# Patient Record
Sex: MALE | Race: WHITE | Employment: FULL TIME | URBAN - METROPOLITAN AREA
[De-identification: names, ages, dates, MRNs, and addresses within clinical notes are randomized per-mention and may not be internally consistent; named-entity substitution may affect disease eponyms.]

---

## 2021-04-08 ENCOUNTER — RECORDS - HEALTHEAST (OUTPATIENT)
Dept: LAB | Facility: CLINIC | Age: 26
End: 2021-04-08

## 2021-04-08 LAB
CHOLEST SERPL-MCNC: 227 MG/DL
FASTING STATUS PATIENT QL REPORTED: ABNORMAL
FASTING STATUS PATIENT QL REPORTED: NORMAL
GLUCOSE BLD-MCNC: 91 MG/DL (ref 70–125)
HDLC SERPL-MCNC: 49 MG/DL
LDLC SERPL CALC-MCNC: 124 MG/DL
TRIGL SERPL-MCNC: 270 MG/DL

## 2022-07-19 ENCOUNTER — LAB REQUISITION (OUTPATIENT)
Dept: LAB | Facility: CLINIC | Age: 27
End: 2022-07-19

## 2022-07-19 DIAGNOSIS — Z13.6 ENCOUNTER FOR SCREENING FOR CARDIOVASCULAR DISORDERS: ICD-10-CM

## 2022-07-19 PROCEDURE — 80061 LIPID PANEL: CPT | Performed by: FAMILY MEDICINE

## 2022-07-20 LAB
CHOLEST SERPL-MCNC: 222 MG/DL
HDLC SERPL-MCNC: 51 MG/DL
LDLC SERPL CALC-MCNC: 150 MG/DL
NONHDLC SERPL-MCNC: 171 MG/DL
TRIGL SERPL-MCNC: 107 MG/DL

## 2022-12-02 ENCOUNTER — LAB REQUISITION (OUTPATIENT)
Dept: LAB | Facility: CLINIC | Age: 27
End: 2022-12-02

## 2022-12-02 DIAGNOSIS — R07.9 CHEST PAIN, UNSPECIFIED: ICD-10-CM

## 2022-12-02 LAB
CRP SERPL-MCNC: <3 MG/L
ERYTHROCYTE [SEDIMENTATION RATE] IN BLOOD BY WESTERGREN METHOD: 3 MM/HR (ref 0–15)
LIPASE SERPL-CCNC: 48 U/L (ref 13–60)

## 2022-12-02 PROCEDURE — 83690 ASSAY OF LIPASE: CPT | Performed by: STUDENT IN AN ORGANIZED HEALTH CARE EDUCATION/TRAINING PROGRAM

## 2022-12-02 PROCEDURE — 86140 C-REACTIVE PROTEIN: CPT | Performed by: STUDENT IN AN ORGANIZED HEALTH CARE EDUCATION/TRAINING PROGRAM

## 2022-12-02 PROCEDURE — 85652 RBC SED RATE AUTOMATED: CPT | Performed by: STUDENT IN AN ORGANIZED HEALTH CARE EDUCATION/TRAINING PROGRAM

## 2022-12-07 ENCOUNTER — OFFICE VISIT (OUTPATIENT)
Dept: CARDIOLOGY | Facility: CLINIC | Age: 27
End: 2022-12-07
Payer: COMMERCIAL

## 2022-12-07 VITALS
WEIGHT: 182.9 LBS | HEART RATE: 83 BPM | BODY MASS INDEX: 27.72 KG/M2 | SYSTOLIC BLOOD PRESSURE: 147 MMHG | DIASTOLIC BLOOD PRESSURE: 90 MMHG | HEIGHT: 68 IN

## 2022-12-07 DIAGNOSIS — R07.9 CHEST PAIN, UNSPECIFIED TYPE: Primary | ICD-10-CM

## 2022-12-07 DIAGNOSIS — R94.31 ABNORMAL ELECTROCARDIOGRAM: ICD-10-CM

## 2022-12-07 DIAGNOSIS — E78.5 HYPERLIPIDEMIA LDL GOAL <100: ICD-10-CM

## 2022-12-07 PROCEDURE — 99204 OFFICE O/P NEW MOD 45 MIN: CPT | Performed by: INTERNAL MEDICINE

## 2022-12-07 PROCEDURE — 93000 ELECTROCARDIOGRAM COMPLETE: CPT | Performed by: INTERNAL MEDICINE

## 2022-12-07 NOTE — LETTER
12/7/2022    Norbert Beasley MD  [address]  [facility] [address]    RE: Miguel Angel Horne       Dear Colleague,     I had the pleasure of seeing Miguel Angel Horne in the [facility].  HPI and Plan:   See dictation    Today's clinic visit entailed:  Review of external notes as documented elsewhere in note    Provider  Link to MDM Help Grid     The level of medical decision making during this visit was of moderate complexity.      Orders Placed This Encounter   Procedures     Follow-Up with Cardiology MICHELLE     EKG 12-lead complete w/read - Clinics (performed today)     Exercise Stress Echocardiogram       Orders Placed This Encounter   Medications     Multiple Vitamin (MULTIVITAMIN ADULT PO)     Sig: Take by mouth daily       There are no discontinued medications.      Encounter Diagnoses   Name Primary?     Chest pain, unspecified type Yes     Abnormal electrocardiogram      Hyperlipidemia LDL goal <100        CURRENT MEDICATIONS:  Current Outpatient Medications   Medication Sig Dispense Refill     Multiple Vitamin (MULTIVITAMIN ADULT PO) Take by mouth daily         ALLERGIES   No Known Allergies    PAST MEDICAL HISTORY:  History reviewed. No pertinent past medical history.    PAST SURGICAL HISTORY:  History reviewed. No pertinent surgical history.    FAMILY HISTORY:  Family History   Problem Relation Age of Onset     Thyroid Disease Mother      Bradycardia Mother      Atrial fibrillation Father 60        afib ablation       SOCIAL HISTORY:  Social History     Socioeconomic History     Marital status: Single     Spouse name: None     Number of children: None     Years of education: None     Highest education level: None   Tobacco Use     Smoking status: Never     Smokeless tobacco: Never   Substance and Sexual Activity     Alcohol use: Yes     Comment: 1 drink, 3 days week       Review of Systems:  Skin:          Eyes:         ENT:         Respiratory:  Positive for   hx asthma as a child  "  Cardiovascular:  Negative;palpitations;syncope or near-syncope;cyanosis;edema Positive for;lightheadedness;exercise intolerance chest pain, at rest, comes and goes  Gastroenterology:        Genitourinary:         Musculoskeletal:         Neurologic:         Psychiatric:         Heme/Lymph/Imm:         Endocrine:  Negative        Physical Exam:  Vitals: BP (!) 147/90   Pulse 83   Ht 1.715 m (5' 7.5\")   Wt 83 kg (182 lb 14.4 oz)   BMI 28.22 kg/m      Constitutional:  cooperative;in no acute distress        Skin:  warm and dry to the touch          Head:  normocephalic, no masses or lesions        Eyes:  pupils equal and round        Lymph:      ENT:  no pallor or cyanosis        Neck:  no carotid bruit        Respiratory:  clear to auscultation;normal symmetry         Cardiac: regular rhythm   S4 no presence of murmur          pulses full and equal                                        GI:  abdomen soft;no bruits        Extremities and Muscular Skeletal:  no deformities, clubbing, cyanosis, erythema observed;no edema              Neurological:  no gross motor deficits;affect appropriate        Psych:  Alert and Oriented x 3        CC  No referring provider defined for this encounter.                Service Date: 12/07/2022    REFERRING PHYSICIAN:  Norbert Beasley MD      HISTORY OF PRESENT ILLNESS:  Mr. Horne is a pleasant 27-year-old male who comes in with his wife today.  He was recently seen in the emergency department for new onset of chest discomfort.  This was 11/29/2022.  He stated it occurred while he was shoveling snow.  It was not particularly heavy or wet snow or deep.  He said it was about 3 inches of snow.  He had played hockey earlier that day also.  He noticed a dull ache in the left side of his chest radiating to his left shoulder.  He rated it at 3/10 at its worst.  He did not have any associated symptoms such as nausea, shortness of breath or diaphoresis.  His initial evaluation was " unremarkable.  He had troponin levels drawn, EKG and chest x-ray, all of which came back normal.  I do have the EKGs available for review.  He does have a short IL interval, but otherwise, this looks fairly normal.  He was advised to take ibuprofen or Advil for about 5 days, which he did 600 mg 3 times a day.  He thinks this may have partially helped but not completely taken it away.  He continues to have waxing and waning chest discomfort.  It does not seem to matter what he is doing, although he notes he has not exercised since this started.  It can even come in the middle of the night.  He does not note any exacerbating or relieving factors.  He does not have any underlying health conditions with the exception he has noticed that his blood pressure has been borderline high in the 130s, sometimes low 140s systolic, with diastolics in the 90s.  He also is noted to have hyperlipidemia based on a cholesterol profile from July.  His total cholesterol was 222, HDL 51,  and triglycerides 107.  There is no family history of premature coronary artery disease, although he had an uncle apparently who had a heart attack at an early age.  He does not smoke, vape or use recreational drugs.  He has moderate caffeine and alcohol intake.  He does not take any medications and has no other health conditions.  We did repeat an EKG today.  It looks fairly similar to his previous.  Again, normal sinus rhythm, no acute ST or T changes.    PHYSICAL EXAMINATION:    VITAL SIGNS:  Blood pressure today was elevated at 147/90, pulse is 83.  Weight is 182 with a body mass index of 28.  NECK:  Carotid upstrokes were brisk without bruit.  CARDIOVASCULAR:  Tones are regular and slow.  I did not appreciate a murmur, gallop, or rub.  He does have an S4.  RESPIRATORY:  Lung fields are clear.  EXTREMITIES:  He has strong and symmetric pulses in the upper and lower extremities.    CHEST:  I did palpate and press on his rib cage.  He does have  some tenderness at the costochondral junction anteriorly at ribs 1 and 2.    SUMMARY:  1.  Mr. Horne is a pleasant 27-year-old male with new onset chest discomfort with underlying history of borderline hypertension and hyperlipidemia.  His EKG is marginally abnormal with a short KS interval.  He has some tenderness along the costochondral junction anteriorly on the left side, but he feels that this is relatively new and has not really felt that the source of his pain was musculoskeletal.  I think it is reasonable to consider a stress echocardiogram for him to evaluate his chest discomfort.  I explained this test to him, and he is agreeable to proceed.  If this comes back negative, I would consider continuing treatment for costochondritis, which would include probably lower dose of nonsteroidal anti-inflammatory 200 mg per day for a period of another week.  I would also, in association with this, use a body pillow to sleep at night to avoid bad positions that may aggravate the costochondral junction.  Also, with his work sitting at a computer, pay attention to his posture and get up at least every hour, not sitting for prolonged periods of times with poor posture, which can also aggravate this as well.  I will have him come in followup with the stress test results once complete.  2. Borderline hypertension.  His blood pressures have been borderline in the past, which would indicate that he is likely to go on to develop high blood pressure.  We talked about following a DASH type diet and avoiding excessive salt along with regular exercise and weight maintenance.  He will need to continue to monitor his blood pressure and I recommend at least once a week and discuss his blood pressure measurements with his primary care provider as he may need to consider antihypertensive therapy.  3.  Hyperlipidemia.  I do note that this appears to be much higher than his previous measurement in 04/2020.  I would recommend following a  Mediterranean-style diet or DASH diet, either will help with his cholesterol, and monitor this again in a year.  I would consider treatment for cholesterol with an LDL above 160.    Please feel free to contact me with any questions you have in regards to his care and thank you for allowing me to participate in the care of your nice patient.    Eileen More DO    cc:  Norbert Beasley MD  [facility]  [address]  [place]  [address]    Eileen More DO        D: 2022   T: 2022   MT: arianne    Name:     ALY KWONG  MRN:      [id number]        Account:      [id number]   :      1995           Service Date: 2022       Document: [id number]      Thank you for allowing me to participate in the care of your patient.      Sincerely,     Eileen More DO     [facility]  cc:   No referring provider defined for this encounter.

## 2022-12-07 NOTE — PROGRESS NOTES
HPI and Plan:   See dictation    Today's clinic visit entailed:  Review of external notes as documented elsewhere in note    Provider  Link to MDM Help Grid     The level of medical decision making during this visit was of moderate complexity.      Orders Placed This Encounter   Procedures     Follow-Up with Cardiology MICHELLE     EKG 12-lead complete w/read - Clinics (performed today)     Exercise Stress Echocardiogram       Orders Placed This Encounter   Medications     Multiple Vitamin (MULTIVITAMIN ADULT PO)     Sig: Take by mouth daily       There are no discontinued medications.      Encounter Diagnoses   Name Primary?     Chest pain, unspecified type Yes     Abnormal electrocardiogram      Hyperlipidemia LDL goal <100        CURRENT MEDICATIONS:  Current Outpatient Medications   Medication Sig Dispense Refill     Multiple Vitamin (MULTIVITAMIN ADULT PO) Take by mouth daily         ALLERGIES   No Known Allergies    PAST MEDICAL HISTORY:  History reviewed. No pertinent past medical history.    PAST SURGICAL HISTORY:  History reviewed. No pertinent surgical history.    FAMILY HISTORY:  Family History   Problem Relation Age of Onset     Thyroid Disease Mother      Bradycardia Mother      Atrial fibrillation Father 60        afib ablation       SOCIAL HISTORY:  Social History     Socioeconomic History     Marital status: Single     Spouse name: None     Number of children: None     Years of education: None     Highest education level: None   Tobacco Use     Smoking status: Never     Smokeless tobacco: Never   Substance and Sexual Activity     Alcohol use: Yes     Comment: 1 drink, 3 days week       Review of Systems:  Skin:          Eyes:         ENT:         Respiratory:  Positive for   hx asthma as a child   Cardiovascular:  Negative;palpitations;syncope or near-syncope;cyanosis;edema Positive for;lightheadedness;exercise intolerance chest pain, at rest, comes and goes  Gastroenterology:        Genitourinary:        "  Musculoskeletal:         Neurologic:         Psychiatric:         Heme/Lymph/Imm:         Endocrine:  Negative        Physical Exam:  Vitals: BP (!) 147/90   Pulse 83   Ht 1.715 m (5' 7.5\")   Wt 83 kg (182 lb 14.4 oz)   BMI 28.22 kg/m      Constitutional:  cooperative;in no acute distress        Skin:  warm and dry to the touch          Head:  normocephalic, no masses or lesions        Eyes:  pupils equal and round        Lymph:      ENT:  no pallor or cyanosis        Neck:  no carotid bruit        Respiratory:  clear to auscultation;normal symmetry         Cardiac: regular rhythm   S4 no presence of murmur          pulses full and equal                                        GI:  abdomen soft;no bruits        Extremities and Muscular Skeletal:  no deformities, clubbing, cyanosis, erythema observed;no edema              Neurological:  no gross motor deficits;affect appropriate        Psych:  Alert and Oriented x 3        CC  No referring provider defined for this encounter.              "

## 2022-12-07 NOTE — PROGRESS NOTES
Service Date: 12/07/2022    REFERRING PHYSICIAN:  Norbert Beasley MD      HISTORY OF PRESENT ILLNESS:  Mr. Horne is a pleasant 27-year-old male who comes in with his wife today.  He was recently seen in the emergency department for new onset of chest discomfort.  This was 11/29/2022.  He stated it occurred while he was shoveling snow.  It was not particularly heavy or wet snow or deep.  He said it was about 3 inches of snow.  He had played hockey earlier that day also.  He noticed a dull ache in the left side of his chest radiating to his left shoulder.  He rated it at 3/10 at its worst.  He did not have any associated symptoms such as nausea, shortness of breath or diaphoresis.  His initial evaluation was unremarkable.  He had troponin levels drawn, EKG and chest x-ray, all of which came back normal.  I do have the EKGs available for review.  He does have a short RI interval, but otherwise, this looks fairly normal.  He was advised to take ibuprofen or Advil for about 5 days, which he did 600 mg 3 times a day.  He thinks this may have partially helped but not completely taken it away.  He continues to have waxing and waning chest discomfort.  It does not seem to matter what he is doing, although he notes he has not exercised since this started.  It can even come in the middle of the night.  He does not note any exacerbating or relieving factors.  He does not have any underlying health conditions with the exception he has noticed that his blood pressure has been borderline high in the 130s, sometimes low 140s systolic, with diastolics in the 90s.  He also is noted to have hyperlipidemia based on a cholesterol profile from July.  His total cholesterol was 222, HDL 51,  and triglycerides 107.  There is no family history of premature coronary artery disease, although he had an uncle apparently who had a heart attack at an early age.  He does not smoke, vape or use recreational drugs.  He has moderate caffeine  and alcohol intake.  He does not take any medications and has no other health conditions.  We did repeat an EKG today.  It looks fairly similar to his previous.  Again, normal sinus rhythm, no acute ST or T changes.    PHYSICAL EXAMINATION:    VITAL SIGNS:  Blood pressure today was elevated at 147/90, pulse is 83.  Weight is 182 with a body mass index of 28.  NECK:  Carotid upstrokes were brisk without bruit.  CARDIOVASCULAR:  Tones are regular and slow.  I did not appreciate a murmur, gallop, or rub.  He does have an S4.  RESPIRATORY:  Lung fields are clear.  EXTREMITIES:  He has strong and symmetric pulses in the upper and lower extremities.    CHEST:  I did palpate and press on his rib cage.  He does have some tenderness at the costochondral junction anteriorly at ribs 1 and 2.    SUMMARY:  1.  Mr. Horne is a pleasant 27-year-old male with new onset chest discomfort with underlying history of borderline hypertension and hyperlipidemia.  His EKG is marginally abnormal with a short NE interval.  He has some tenderness along the costochondral junction anteriorly on the left side, but he feels that this is relatively new and has not really felt that the source of his pain was musculoskeletal.  I think it is reasonable to consider a stress echocardiogram for him to evaluate his chest discomfort.  I explained this test to him, and he is agreeable to proceed.  If this comes back negative, I would consider continuing treatment for costochondritis, which would include probably lower dose of nonsteroidal anti-inflammatory 200 mg per day for a period of another week.  I would also, in association with this, use a body pillow to sleep at night to avoid bad positions that may aggravate the costochondral junction.  Also, with his work sitting at a computer, pay attention to his posture and get up at least every hour, not sitting for prolonged periods of times with poor posture, which can also aggravate this as well.  I will  have him come in followup with the stress test results once complete.  2. Borderline hypertension.  His blood pressures have been borderline in the past, which would indicate that he is likely to go on to develop high blood pressure.  We talked about following a DASH type diet and avoiding excessive salt along with regular exercise and weight maintenance.  He will need to continue to monitor his blood pressure and I recommend at least once a week and discuss his blood pressure measurements with his primary care provider as he may need to consider antihypertensive therapy.  3.  Hyperlipidemia.  I do note that this appears to be much higher than his previous measurement in 2020.  I would recommend following a Mediterranean-style diet or DASH diet, either will help with his cholesterol, and monitor this again in a year.  I would consider treatment for cholesterol with an LDL above 160.    Please feel free to contact me with any questions you have in regards to his care and thank you for allowing me to participate in the care of your nice patient.    Eileen More DO    cc:  Norbert Beasley MD  [facility]  [address]  [place]  [address]    Eileen More DO        D: 2022   T: 2022   MT: arianne    Name:     ALY KWONG  MRN:      [id number]        Account:      [id number]   :      1995           Service Date: 2022       Document: [id number]   No

## 2022-12-15 ENCOUNTER — HOSPITAL ENCOUNTER (OUTPATIENT)
Dept: CARDIOLOGY | Facility: CLINIC | Age: 27
Discharge: HOME | End: 2022-12-15
Attending: INTERNAL MEDICINE | Admitting: INTERNAL MEDICINE
Payer: COMMERCIAL

## 2022-12-15 DIAGNOSIS — R94.31 ABNORMAL ELECTROCARDIOGRAM: ICD-10-CM

## 2022-12-15 DIAGNOSIS — E78.5 HYPERLIPIDEMIA LDL GOAL <100: ICD-10-CM

## 2022-12-15 DIAGNOSIS — R07.9 CHEST PAIN, UNSPECIFIED TYPE: ICD-10-CM

## 2022-12-15 PROCEDURE — 93325 DOPPLER ECHO COLOR FLOW MAPG: CPT | Mod: TC

## 2022-12-15 PROCEDURE — 93321 DOPPLER ECHO F-UP/LMTD STD: CPT | Mod: TC

## 2022-12-15 PROCEDURE — 93350 STRESS TTE ONLY: CPT | Mod: 26 | Performed by: INTERNAL MEDICINE

## 2022-12-15 PROCEDURE — 93321 DOPPLER ECHO F-UP/LMTD STD: CPT | Mod: 26 | Performed by: INTERNAL MEDICINE

## 2022-12-15 PROCEDURE — 93325 DOPPLER ECHO COLOR FLOW MAPG: CPT | Mod: 26 | Performed by: INTERNAL MEDICINE

## 2022-12-15 PROCEDURE — 255N000002 HC RX 255 OP 636: Performed by: INTERNAL MEDICINE

## 2022-12-15 PROCEDURE — 93016 CV STRESS TEST SUPVJ ONLY: CPT | Performed by: INTERNAL MEDICINE

## 2022-12-15 PROCEDURE — 93018 CV STRESS TEST I&R ONLY: CPT | Performed by: INTERNAL MEDICINE

## 2022-12-15 PROCEDURE — C8928 TTE W OR W/O FOL W/CON,STRES: HCPCS

## 2022-12-15 RX ADMIN — HUMAN ALBUMIN MICROSPHERES AND PERFLUTREN 9 ML: 10; .22 INJECTION, SOLUTION INTRAVENOUS at 13:08

## 2023-02-05 ENCOUNTER — HEALTH MAINTENANCE LETTER (OUTPATIENT)
Age: 28
End: 2023-02-05

## 2023-04-27 ENCOUNTER — OFFICE VISIT (OUTPATIENT)
Dept: CARDIOLOGY | Facility: CLINIC | Age: 28
End: 2023-04-27
Attending: INTERNAL MEDICINE
Payer: COMMERCIAL

## 2023-04-27 VITALS
WEIGHT: 180.2 LBS | DIASTOLIC BLOOD PRESSURE: 70 MMHG | BODY MASS INDEX: 27.31 KG/M2 | HEART RATE: 88 BPM | HEIGHT: 68 IN | OXYGEN SATURATION: 98 % | SYSTOLIC BLOOD PRESSURE: 126 MMHG

## 2023-04-27 DIAGNOSIS — E78.5 HYPERLIPIDEMIA LDL GOAL <100: ICD-10-CM

## 2023-04-27 DIAGNOSIS — R07.9 CHEST PAIN, UNSPECIFIED TYPE: ICD-10-CM

## 2023-04-27 DIAGNOSIS — R94.31 ABNORMAL ELECTROCARDIOGRAM: ICD-10-CM

## 2023-04-27 PROCEDURE — 99214 OFFICE O/P EST MOD 30 MIN: CPT | Performed by: INTERNAL MEDICINE

## 2023-04-27 NOTE — PROGRESS NOTES
Service Date: 2023    HISTORY OF PRESENT ILLNESS:  Mr. Horne is a pleasant 27-year-old male who comes into clinic today for a followup visit.  He was seen back in December for some atypical chest pain.  He did undergo an exercise stress test after presenting to the ED and being discharged from the ED.  He walked 15 minutes on a standard Dixon protocol without any symptoms.  His echo and stress tests were completely normal.  He did take ibuprofen for probable costochondritis, which he says helped partially relieve his symptoms.    Lately, he is experiencing some numbness and tingling in his left shoulder and also occasional twinges of chest pain on the left side.  With a , it has been difficult to follow a heart healthy diet, but he seems motivated to getting back into being a little bit more strict with his diet.  He continues to play hockey once a week.      His blood pressure is much better.  Last time I saw him, it was 147/90; today was 126/70, pulse is 88, weight is 180, body mass index of 27 and 5.  Physical exam findings were otherwise unchanged.      IMPRESSION:  In summary, Mr. Horne is a pleasant 27-year-old male with atypical chest pain symptoms, normal stress echo.  He was seen in the ED prior to our visit and had a workup including troponins, chest x-ray, etc., all of which were normal.  He is having some paresthesias in his left shoulder, which may be brachial plexus related.  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      I have encouraged more regular aerobic exercise at least 3 times per week.  We also again talked about the DASH diet for helping in lowering blood pressure, but also his cholesterol and I would advise that he have his cholesterol checked within the next year with his primary.      I am happy to see him back at any time for any  of his future cardiovascular needs.    Please feel free to contact me with any questions you have in regards to his care.    Eileen More MD    cc:    Norbert Beasley MD  [facility]  [address]  [place] [address]     Eileen More DO        D: 2023   T: 2023   MT: KILO    Name:     ALY KWONG  MRN:      [id number]        Account:      [id number]   :      1995           Service Date: 2023       Document: [id number]

## 2023-04-27 NOTE — PROGRESS NOTES
HPI and Plan:   See dictation    Today's clinic visit entailed:    30 minutes spent by me on the date of the encounter doing chart review, history and exam, documentation and further activities per the note  Provider  Link to MDM Help Grid           No orders of the defined types were placed in this encounter.      No orders of the defined types were placed in this encounter.      There are no discontinued medications.      Encounter Diagnoses   Name Primary?     Chest pain, unspecified type      Abnormal electrocardiogram      Hyperlipidemia LDL goal <100        CURRENT MEDICATIONS:  Current Outpatient Medications   Medication Sig Dispense Refill     Multiple Vitamin (MULTIVITAMIN ADULT PO) Take by mouth daily         ALLERGIES   No Known Allergies    PAST MEDICAL HISTORY:  No past medical history on file.    PAST SURGICAL HISTORY:  No past surgical history on file.    FAMILY HISTORY:  Family History   Problem Relation Age of Onset     Thyroid Disease Mother      Bradycardia Mother      Atrial fibrillation Father 60        afib ablation       SOCIAL HISTORY:  Social History     Socioeconomic History     Marital status: Single     Spouse name: None     Number of children: None     Years of education: None     Highest education level: None   Tobacco Use     Smoking status: Never     Smokeless tobacco: Never   Substance and Sexual Activity     Alcohol use: Yes     Comment: 1 drink, 3 days week       Review of Systems:  Skin:          Eyes:         ENT:         Respiratory:  Negative       Cardiovascular:  Negative;palpitations;dizziness;lightheadedness;edema;fatigue chest pain;Positive for chest pain: discomfort does in left arm and neck  Gastroenterology:        Genitourinary:         Musculoskeletal:         Neurologic:         Psychiatric:         Heme/Lymph/Imm:         Endocrine:  Negative        Physical Exam:  Vitals: /70 (BP Location: Right arm, Patient Position: Sitting)   Pulse 88   Ht 1.715 m (5'  "7.5\")   Wt 81.7 kg (180 lb 3.2 oz)   SpO2 98%   BMI 27.81 kg/m      Constitutional:  cooperative;in no acute distress        Skin:  warm and dry to the touch          Head:  normocephalic, no masses or lesions        Eyes:  pupils equal and round        Lymph:      ENT:  no pallor or cyanosis        Neck:  no carotid bruit        Respiratory:  clear to auscultation;normal symmetry         Cardiac: regular rhythm   S4 no presence of murmur          pulses full and equal                                        GI:  abdomen soft;no bruits        Extremities and Muscular Skeletal:  no deformities, clubbing, cyanosis, erythema observed;no edema              Neurological:  no gross motor deficits;affect appropriate        Psych:  Alert and Oriented x 3        CC  Eileen More DO  [address]  [place]  [place] [address]              "

## 2023-04-27 NOTE — LETTER
4/27/2023    Norbert Beasley MD  [address]  [facility] [address]    RE: Miguel Angel Horne       Dear Colleague,     I had the pleasure of seeing Miguel Angel Horne in the [facility].  HPI and Plan:   See dictation    Today's clinic visit entailed:    30 minutes spent by me on the date of the encounter doing chart review, history and exam, documentation and further activities per the note  Provider  Link to MDM Help Grid           No orders of the defined types were placed in this encounter.      No orders of the defined types were placed in this encounter.      There are no discontinued medications.      Encounter Diagnoses   Name Primary?    Chest pain, unspecified type     Abnormal electrocardiogram     Hyperlipidemia LDL goal <100        CURRENT MEDICATIONS:  Current Outpatient Medications   Medication Sig Dispense Refill    Multiple Vitamin (MULTIVITAMIN ADULT PO) Take by mouth daily         ALLERGIES   No Known Allergies    PAST MEDICAL HISTORY:  No past medical history on file.    PAST SURGICAL HISTORY:  No past surgical history on file.    FAMILY HISTORY:  Family History   Problem Relation Age of Onset    Thyroid Disease Mother     Bradycardia Mother     Atrial fibrillation Father 60        afib ablation       SOCIAL HISTORY:  Social History     Socioeconomic History    Marital status: Single     Spouse name: None    Number of children: None    Years of education: None    Highest education level: None   Tobacco Use    Smoking status: Never    Smokeless tobacco: Never   Substance and Sexual Activity    Alcohol use: Yes     Comment: 1 drink, 3 days week       Review of Systems:  Skin:          Eyes:         ENT:         Respiratory:  Negative       Cardiovascular:  Negative;palpitations;dizziness;lightheadedness;edema;fatigue chest pain;Positive for chest pain: discomfort does in left arm and neck  Gastroenterology:        Genitourinary:         Musculoskeletal:         Neurologic:        "  Psychiatric:         Heme/Lymph/Imm:         Endocrine:  Negative        Physical Exam:  Vitals: /70 (BP Location: Right arm, Patient Position: Sitting)   Pulse 88   Ht 1.715 m (5' 7.5\")   Wt 81.7 kg (180 lb 3.2 oz)   SpO2 98%   BMI 27.81 kg/m      Constitutional:  cooperative;in no acute distress        Skin:  warm and dry to the touch          Head:  normocephalic, no masses or lesions        Eyes:  pupils equal and round        Lymph:      ENT:  no pallor or cyanosis        Neck:  no carotid bruit        Respiratory:  clear to auscultation;normal symmetry         Cardiac: regular rhythm   S4 no presence of murmur          pulses full and equal                                        GI:  abdomen soft;no bruits        Extremities and Muscular Skeletal:  no deformities, clubbing, cyanosis, erythema observed;no edema              Neurological:  no gross motor deficits;affect appropriate        Psych:  Alert and Oriented x 3        CC  Eileen More, DO  [address] [facility] [address] [address]  [place] [address]      Service Date: 2023    HISTORY OF PRESENT ILLNESS:  Mr. Horne is a pleasant 27-year-old male who comes into clinic today for a followup visit.  He was seen back in December for some atypical chest pain.  He did undergo an exercise stress test after presenting to the ED and being discharged from the ED.  He walked 15 minutes on a standard Dixon protocol without any symptoms.  His echo and stress tests were completely normal.  He did take ibuprofen for probable costochondritis, which he says helped partially relieve his symptoms.    Lately, he is experiencing some numbness and tingling in his left shoulder and also occasional twinges of chest pain on the left side.  With a , it has been difficult to follow a heart healthy diet, but he seems motivated to getting back into being a little bit more strict with his diet.  He continues to play hockey once a week.      His blood pressure is much " better.  Last time I saw him, it was 147/90; today was 126/70, pulse is 88, weight is 180, body mass index of 27 and 5.  Physical exam findings were otherwise unchanged.      IMPRESSION:  In summary, Mr. Horne is a pleasant 27-year-old male with atypical chest pain symptoms, normal stress echo.  He was seen in the ED prior to our visit and had a workup including troponins, chest x-ray, etc., all of which were normal.  He is having some paresthesias in his left shoulder, which may be brachial plexus related.  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      I have encouraged more regular aerobic exercise at least 3 times per week.  We also again talked about the DASH diet for helping in lowering blood pressure, but also his cholesterol and I would advise that he have his cholesterol checked within the next year with his primary.      I am happy to see him back at any time for any of his future cardiovascular needs.    Please feel free to contact me with any questions you have in regards to his care.    Eileen More MD    cc:    Norbert Beasley MD  [facility]  [address]  [place] [address]     Eileen More DO        D: 2023   T: 2023   MT: KILO    Name:     ALY HORNE  MRN:      [id number]        Account:      [id number]   :      1995           Service Date: 2023       Document: [id number]     Thank you for allowing me to participate in the care of your patient.      Sincerely,     Eileen More DO     [facility]  cc:   Eileen More DO  [address] [place] [id number]

## 2024-03-03 ENCOUNTER — HEALTH MAINTENANCE LETTER (OUTPATIENT)
Age: 29
End: 2024-03-03

## 2024-09-12 ENCOUNTER — LAB REQUISITION (OUTPATIENT)
Dept: LAB | Facility: CLINIC | Age: 29
End: 2024-09-12

## 2024-09-12 DIAGNOSIS — E78.5 HYPERLIPIDEMIA, UNSPECIFIED: ICD-10-CM

## 2024-09-12 LAB
CHOLEST SERPL-MCNC: 224 MG/DL
FASTING STATUS PATIENT QL REPORTED: ABNORMAL
HDLC SERPL-MCNC: 48 MG/DL
LDLC SERPL CALC-MCNC: 156 MG/DL
NONHDLC SERPL-MCNC: 176 MG/DL
TRIGL SERPL-MCNC: 100 MG/DL

## 2024-09-12 PROCEDURE — 82465 ASSAY BLD/SERUM CHOLESTEROL: CPT | Performed by: FAMILY MEDICINE

## 2025-03-16 ENCOUNTER — HEALTH MAINTENANCE LETTER (OUTPATIENT)
Age: 30
End: 2025-03-16